# Patient Record
Sex: FEMALE | Race: WHITE
[De-identification: names, ages, dates, MRNs, and addresses within clinical notes are randomized per-mention and may not be internally consistent; named-entity substitution may affect disease eponyms.]

---

## 2019-01-25 ENCOUNTER — HOSPITAL ENCOUNTER (OUTPATIENT)
Dept: HOSPITAL 41 - JD.ED | Age: 34
Discharge: HOME | End: 2019-01-25
Attending: OBSTETRICS & GYNECOLOGY
Payer: COMMERCIAL

## 2019-01-25 DIAGNOSIS — Z91.018: ICD-10-CM

## 2019-01-25 DIAGNOSIS — Z90.721: ICD-10-CM

## 2019-01-25 DIAGNOSIS — K66.0: ICD-10-CM

## 2019-01-25 DIAGNOSIS — O00.101: Primary | ICD-10-CM

## 2019-01-25 DIAGNOSIS — Z88.1: ICD-10-CM

## 2019-01-25 DIAGNOSIS — E66.9: ICD-10-CM

## 2019-01-25 DIAGNOSIS — Z79.899: ICD-10-CM

## 2019-01-25 DIAGNOSIS — Z98.890: ICD-10-CM

## 2019-01-25 PROCEDURE — 99281 EMR DPT VST MAYX REQ PHY/QHP: CPT

## 2019-01-25 PROCEDURE — 59151 TREAT ECTOPIC PREGNANCY: CPT

## 2019-01-25 NOTE — PCM.OPNOTE
- General Post-Op/Procedure Note


Date of Surgery/Procedure: 01/25/19


Operative Procedure(s): laparoscopic right salpingectomy


Findings: 





right tubal ectopic


Pre Op Diagnosis: right ectopic


Post-Op Diagnosis: Same


Anesthesia Technique: General ET Tube


Primary Surgeon: Joanie Egan


Fluid Replacement, Intraop: 900


Output, Urine Amount: 0


EBL in mLs: 25


Complications: none


Condition: Good


Free Text/Narrative:: 





Patient taken to OR. Prepped and draped in lithotomy in [facility]. Hulka 

placed on cervix.


Attention turned to abdomen. Umbilicus infiltrated with local. Stab incision 

made with 11 blade. Veress needle inserted. Low opening pressure. 5 mm port 

placed under direct visualization. Intra abdominal placement confirmed. 

Adhesions to anterior abdominal wall on left visualized. Two right lateral 

ports placed. One an 11 for used of endocatch bag. Right tubal ectopic noted. 

Left tube and ovary absent. Right fallopian tube removed with ligasure. Placed 

in endocatch bag and removed. Excellent hemostasis noted. 





Ports removed under direct visualization. 11 mm fascia closed with 0 vicryl on 

a ur6. Skin closed with 4-0 monocryl. Hulka removed. Cervix hemostatic. Patient 

awakened and taken to PACU in excellent condition.

## 2019-01-25 NOTE — PCM.POSTAN
POST ANESTHESIA ASSESSMENT





- MENTAL STATUS


Mental Status: Alert, Oriented





- VITAL SIGNS


Pulse Rate: 129


SaO2: 98


Resp Rate: 19


Blood Pressure: 143/91


Temperature: 98 F





- RESPIRATORY


Respiratory Status: Respiratory Rate WNL, Airway Patent, O2 Saturation Stable, 

Supplemental Oxygen





- CARDIOVASCULAR


CV Status: Pulse Rate WNL, Blood Pressure Stable





- GASTROINTESTINAL


GI Status: No Symptoms





- PAIN


Pain Score: 0





- POST OP HYDRATION


Hydration Status: Adequate & Stable

## 2019-01-25 NOTE — PCM.PREANE
Preanesthetic Assessment





- Procedure


Proposed Procedure: 





laparascopic right salpingectomy





- Anesthesia/Transfusion/Family Hx


Anesthesia History: Prior Anesthesia Without Reaction


Family History of Anesthesia Reaction: No


Transfusion History: No Prior Transfusion(s)





- Review of Systems


General: No Symptoms


Pulmonary: No Symptoms


Cardiovascular: No Symptoms


Gastrointestinal: Abdominal Pain (today)


Neurological: No Symptoms


Other: Reports: None





- Physical Assessment


NPO Status Date: 01/25/19


NPO Status Time: 14:45 (fries and water)


Pulse: 96


O2 Sat by Pulse Oximetry: 100


Respiratory Rate: 20


Blood Pressure: 180/100


Temperature: 101.2 F


Height: 4 ft 10 in


Weight: 74.843 kg


ASA Class: 2E


Mental Status: Alert & Oriented x3


Airway Class: Mallampati = 1


Dentition: Reports: Normal Dentition


Thyro-Mental Finger Breadths: 3


Mouth Opening Finger Breadths: 3


ROM/Head Extension: Full


Lungs: Clear to Auscultation, Normal Respiratory Effort


Cardiovascular: Regular Rate, Regular Rhythm





- Allergies


Allergies/Adverse Reactions: 


 Allergies











Allergy/AdvReac Type Severity Reaction Status Date / Time


 


azithromycin Allergy  Itching Verified 01/25/19 15:28


 


chicken derived Allergy  Swelling Verified 01/25/19 15:28














- Blood


Blood Available: Yes





- Acknowledgements


Anesthesia Type Planned: General Anesthesia


Pt an Appropriate Candidate for the Planned Anesthesia: Yes


Alternatives and Risks of Anesthesia Discussed w Pt/Guardian: Yes


Pt/Guardian Understands and Agrees with Anesthesia Plan: Yes





PreAnesthesia Questionnaire


Cardiovascular History: Reports: None


Respiratory History: Reports: None


Gastrointestinal History: Reports: None


Endocrine/Metabolic History: Reports: Obesity/BMI 30+





- Past Surgical History


Female  Surgical History: Reports: Other (See Below) (left tube and ovary 

removed)





- SUBSTANCE USE


Smoking Status *Q: Never Smoker


Tobacco Use Within Last Twelve Months: No


Second Hand Smoke Exposure: No


Days Per Week of Alcohol Use: 1 (occ)


Recreational Drug Use History: No





- HOME MEDS


Home Medications: 


 Home Meds





Folic Acid 0.8 mg PO DAILY 01/25/19 [History]